# Patient Record
Sex: MALE | URBAN - METROPOLITAN AREA
[De-identification: names, ages, dates, MRNs, and addresses within clinical notes are randomized per-mention and may not be internally consistent; named-entity substitution may affect disease eponyms.]

---

## 2017-02-16 ENCOUNTER — OFFICE VISIT (OUTPATIENT)
Dept: FAMILY MEDICINE | Facility: CLINIC | Age: 52
End: 2017-02-16

## 2017-02-16 VITALS
OXYGEN SATURATION: 98 % | DIASTOLIC BLOOD PRESSURE: 100 MMHG | TEMPERATURE: 98.2 F | SYSTOLIC BLOOD PRESSURE: 132 MMHG | RESPIRATION RATE: 16 BRPM | BODY MASS INDEX: 30.17 KG/M2 | WEIGHT: 187.7 LBS | HEIGHT: 66 IN | HEART RATE: 64 BPM

## 2017-02-16 DIAGNOSIS — K30 INDIGESTION: ICD-10-CM

## 2017-02-16 DIAGNOSIS — M54.12 CERVICAL RADICULOPATHY: Primary | ICD-10-CM

## 2017-02-16 RX ORDER — IBUPROFEN 200 MG
400 TABLET ORAL EVERY 6 HOURS PRN
Qty: 100 TABLET | Refills: 0 | Status: SHIPPED | OUTPATIENT
Start: 2017-02-16 | End: 2017-03-18

## 2017-02-16 NOTE — MR AVS SNAPSHOT
After Visit Summary   2017    El Hightower    MRN: [id number]           Patient Information     Date Of Birth          1965        Visit Information        Provider Department      2017 7:30 PM Clinician, Phi [facility]        Today's Diagnoses     Cervical radiculopathy    -  1      Care Instructions    Patient Visit Summary    Patient Name: El Hightower  MRN: [id number]    Date of Visit: 2017    Principle Diagnosis:Radiculopathy and indigestion    Physician's Recommendations/Instructions: Practice exercises and stretches as directed by physical therapy for neck and chest. Take anti-inflammatory and antacid medication as prescribed.     Lab Tests Performed: None      Physician: Karmen Lopez        Follow-ups after your visit        Who to contact     Please call your clinic at [phone] to:    Ask questions about your health    Make or cancel appointments    Discuss your medicines    Learn about your test results    Speak to your doctor   If you have compliments or concerns about an experience at your clinic, or if you wish to file a complaint, please contact [facility] Patient Relations at [phone] or email us at [email or web address]         Additional Information About Your Visit        MyChart Information     [employer] is an electronic gateway that provides easy, online access to your medical records. With [employer], you can request a clinic appointment, read your test results, renew a prescription or communicate with your care team.     To sign up for [employer] visit the website at [email or web address]   You will be asked to enter the access code listed below, as well as some personal information. Please follow the directions to create your username and password.     Your access code is: [email or web address]  Expires: 2017  9:08 PM     Your access code will  in 90 days. If you need help or a new code,  please contact your [facility] or call [phone] for assistance.        Care EveryWhere ID     This is your Care EveryWhere ID. This could be used by other organizations to access your [place] medical records  [id number]         Blood Pressure from Last 3 Encounters:   No data found for BP    Weight from Last 3 Encounters:   No data found for Wt              Today, you had the following     No orders found for display       Primary Care Provider    None Specified       No primary provider on file.        Thank you!     Thank you for choosing [facility]  for your care. Our goal is always to provide you with excellent care. Hearing back from our patients is one way we can continue to improve our services. Please take a few minutes to complete the written survey that you may receive in the mail after your visit with us. Thank you!             Your Updated Medication List - Protect others around you: Learn how to safely use, store and throw away your medicines at www.disposemymeds.org.      Notice  As of 2/16/2017  9:08 PM    You have not been prescribed any medications.

## 2017-02-17 NOTE — NURSING NOTE
JOSETTE is a 51 y.o male who presented with right sided chest and arm pain that started four months ago. The pain began in his chest and has progressed to include his arm and wrist. Does exercises to help, but it has not been effective. Cracking knuckles helps when pain is in his hands. Describes pain as sharp. Feels it in the joints and sometimes in the muscles. Also complains of gas - pt thought it was related, but antacids not effective. Has experienced this once before when in [facility], was given amoxacillin, which helped. PHQ-2 score = 0    Note by Merle Song, SN

## 2017-02-17 NOTE — PATIENT INSTRUCTIONS
Patient Visit Summary    Patient Name: El Hightower  MRN: [id number]    Date of Visit: 2/16/2017    Principle Diagnosis:Radiculopathy and indigestion    Physician's Recommendations/Instructions: Practice exercises and stretches as directed by physical therapy for neck and chest. Take anti-inflammatory and antacid medication as prescribed.     Lab Tests Performed: None      Physician: Karmen Lopez

## 2017-02-17 NOTE — PROGRESS NOTES
Community Health Worker Progress Note    Encounter Summary    The patient was interested in food resources in the community, so we provided him with a list of food pantries near his home. -[employer]

## 2017-02-17 NOTE — PROGRESS NOTES
"MEDICINE NOTE    SUBJECTIVE:    Campbell Gallegos is a 51-year-old male with no significant past medical history who presents to clinic with right-sided chest pain. The [place] from [facility], and arrived in the country around November, 2016.     Four months prior to his current presentation, the patient developed right-sided chest and shoulder pain. At times, the pain moves downward to his right elbow and right wrist. He states that he feels the discomfort \"from time to time,\" and it is worse in the evening hours or after sleeping on his right side. With deep inspiration, he develops sharp pain in the front of his chest that is localized inferior to his right axilla. He has not taken any medications for pain control, but states that his pain is somewhat relieved by flexing and extending his arm. He denies an inciting injury prior to the onset of pain. He additionally denies weakness, numbness, tingling, diminished sensation or decreased range of motion of his right shoulder, arm, hand and fingers. There is no swelling or redness in the joints of his right upper extremity, and he has not had any neck pain. He denies recent history of palpitations, dyspnea, nausea, diaphoresis, flu-like symptoms, fever or chills. His chest and shoulder pain have not worsened during the past several months, but he sought care today as he would like to keep his symptoms from progressing. He had similar pain in July/August of 2015. He was treated with amoxacillin in his home country of [facility], and the pain resolved shortly thereafter. He was not informed of a diagnosis at that time.     Additionally, the patient reports that he regularly suffers from gas. He denies nausea, vomiting, abdominal pain and diarrhea, but notes occasional constipation. He wonders if the gas discomfort is related to the chest and shoulder pain, but states that the symptoms occur at unrelated times.     REVIEW OF SYSTEMS:  Gen: no fevers, " "chills, night sweats. Pt notes he has been sleeping a lot lately, denies daytime fatigue.  Eyes: no vision change, diplopia, burry vision  Ears, Noses, Mouth, Throat: no ringing in ears or hearing change, no rhinorrhea, no nasal congestion  Cardiac: Right-sided chest pain, see HPI. No palpitations, or pain with walking  Lungs: no dyspnea, cough, or shortness of breath  GI: no nausea, vomiting, diarrhea, no abdominal pain. Notes frequent gas. Occasional constipation.  : no change in urination, hematuria or dysuria  Musculoskeletal: Joint pain in right shoulder, elbow and wrist (see HPI). No LUE or LE joint pain. Full ROM in all extremities.   Skin: no rash, lumps, bruises or recent skin changes  Neuro: no loss of strength or sensation, no numbness or tingling, no headache or dizziness.   Endo: no polyuria or polydipsia  Allergy: no known environmental or drug allergies  Psych: no depression or mood changes    Past Medical History  None    Past Surgical History  None    Family History  None    Social History     Social History     Marital status: Single     Spouse name: N/A     Number of children: N/A     Years of education: N/A     Occupational History     Not on file.     Social History Main Topics     Smoking status: Never Smoker     Smokeless tobacco: Not on file     Alcohol use No     Drug use: No     Sexual activity: Not on file     Other Topics Concern     Not on file     Social History Narrative    2/16/17 - The patient was interested in food resources in the community, so we provided him with a list of food pantries near his home. -[facility]       OBJECTIVE:  Physical Exam:  BP (!) 132/100 (BP Location: Right arm)  Pulse 64  Temp 98.2  F (36.8  C) (Oral)  Resp 16  Ht 5' 5.75\" (167 cm)  Wt 187 lb 11.2 oz (85.1 kg)  SpO2 98%  BMI 30.53 kg/m2  Constitutional: no distress, comfortable, pleasant    Cardiovascular: regular rate and rhythm, normal S1 and S2, no murmurs, rubs or gallops.  Respiratory: clear to " auscultation, no wheezes or crackles, normal breath sounds   Musculoskeletal: Positive Spurling's sign on right. No tenderness with compression of right ulnar nerve at ulnar groove. Tinel's sign negative on right. Mild tenderness to palpation of right trapezius. Chest, shoulder and upper extremities appear symmetric upon inspection.   Skin: no concerning lesions  Psychological: appropriate mood     ASSESSMENT/PLAN:  Cervical radiculopathy  Assessment: Pt with 4 months of right chest/shoulder pain with radiation to right elbow and wrist. No cardiac or infectious symptoms. PE pertinent for positive Spurling's sign on right. Negative tinel's sign on right and normal compression of right ulnar nerve at the ulnar groove. History and PE is consistent with right cervical radiculopathy.   Plan:   - PT consult for appropriate strengthening exercises  - Pt was prescribed Ibuprofen 200 MG tablet; 2 tablets PO q6 hrs prn for pain.     Indigestion  Assessment: Pt with frequent gas and indigestion. Occasional constipation. No diarrhea, abdominal pain, nausea or vomiting. H. Pylori infection could be considered given persistent symptoms and country of origin.   Plan:  - Pt was prescribed ranitidine 150 MG tablet; 1 tablet PO daily as needed for indigestion  - H. Pylori test is not available at [facility]. The patient was instructed to follow up with his PCP for H. Pylori testing if symptoms continue or worsen.       Med Clinician: Kelsi Solares MS2  Preceptor: Karmen Ascencio MD    In supervising the medical student, I repeated the exam documented above.  I have reviewed and verified the student s documentation.  Supervising Provider: Karmen Ascencio MD.

## 2017-02-18 NOTE — PROGRESS NOTES
"[facility] Progress Note    Chief complaint: LINN presented to the clinic with complaints of right chest pain and joint pain.     Subjective:  LINN is a 51-year old male from [facility] who is currently visiting family in the [place]. He has been in the [place] since November of 2016.     Condition 1: Radiculopathy  Patient began experiencing right-sided chest pain that radiates to his elbow and wrist approximately 4 months ago. According to LINN, he experienced a similar pain in July/August of 2016 while in [facility] and was prescribed amoxicillin to treat the pain. The states that the treatment he received was able to alleviate his pain. Patient describes his pain as being \"sharp\" when he takes a deep breath. He believes his pain is especially worse in the evening hours and causes discomfort when he is sleeping. He denies any fatigue, numbness, tingling, or any injuries. Patient states that cracking his knuckles is able to provide pain relief and described the pain as being centered in his joints and shoulders. He has not tried any medication therapy for treatment.      Condition 2: Indigestion  Patient reported episodes of gas and indigestion. He denies nausea, vomiting, diarrhea or abdominal pain. He does not believe the gas and indigestion is related to changes in food. He originally thought the indigestion was related to his chest and shoulder pain. He has taken antacids and tea to relieve the indigestion and gas but has not found them to be helpful.       Current Outpatient Prescriptions   Medication Sig Dispense Refill     ibuprofen (ADVIL/MOTRIN) 200 MG tablet Take 2 tablets (400 mg) by mouth every 6 hours as needed for mild pain (to a maximum of 3200mg daily) 100 tablet 0     ranitidine (ZANTAC) 150 MG tablet Take 1 tablet (150 mg) by mouth daily as needed (indigestion) 30 tablet 0         Objective:  BP (!) 132/100 (BP Location: Right arm)  Pulse 64  Temp 98.2  F (36.8  C) (Oral)  Resp 16  Ht 5' 5.75\" " (167 cm)  Wt 187 lb 11.2 oz (85.1 kg)  SpO2 98%  BMI 30.53 kg/m2    Assessment:     Condition 1- Cervical Radiculopathy - Uncontrolled  DTP: Needs Additional Therapy: untreated condition   Rationale: Upon assessment by MD, it was determined that patient is experiencing cervical radiculopathy. Patient was provided with a PT consult for strength exercises. To alleviate the pain, patient would benefit from a therapy of NSAIDs such as ibuprofen or naproxen.     Condition 2- Indigestion - Uncontrolled  DTP: Needs Additional Therapy: untreated condition   Rationale: Patient is experiencing occasional indigestion and would benefit from a therapy of antacids, H2 receptor antagonists, or PPIs. Patient has already tried antacids and has found it to be ineffective. H2RAs can be taken as needed and would preferable over PPIs in this patient.     Plan:  1. Initiate Ibuprofen 200mg, 2 tablets by mouth every 6 hours as needed for pain (to a maximum of 3200mg per day). Patient can take it with food to avoid GI irritation.  2. Initiate Ranitidine 150mg, 1 tablet by mouth as needed for indigestion.   3. Educate patient to avoid using other NSAIDs while on ibuprofen and to not exceed 3200mg per day. Also educate on side effects such as: GI irritation, nausea, diarrhea, vomiting, and hypotension.   4. Educate on side effects of ranitidine such as headache, nausea, and constipation.       Pharmacy Follow-Up Plan (Method, Date, Parameters):   Follow-up via phone call in 1 week to assess for safety and efficacy of new therapies. Assess for side effects of ibuprofen (GI upset, hypotension, N/V/D) and effectiveness of ibuprofen in relieving shoulder/joint pain. During the call, also follow up on the safety (side effects: headache, nausea, constipation) and efficacy of ranitidine in relieving indigestion. Ask patient to return to clinic if pain has not improved.     PharmCare Clinician: Vanessa Ovalels  Pharmacy Preceptor: Neil  César    _____________________________  Preceptor Use Only:  In supervising the student, I have reviewed and verified the student's documentation and found it to be correct and complete.   Preceptor Signature:Neil Lindsey, Pharm D.  Clinical Oncology Pharmacist  [phone]    February 20, 2017

## 2017-02-23 ENCOUNTER — TELEPHONE (OUTPATIENT)
Dept: FAMILY MEDICINE | Facility: CLINIC | Age: 52
End: 2017-02-23

## 2017-02-24 NOTE — TELEPHONE ENCOUNTER
----- Message from Vanessa Ovalles sent at 2/18/2017  2:18 PM CST -----  Regarding: f/u 2/23/17 English speaking  Contact: [phone]  LINN is a 51-year old male from [facility] who is currently visiting family in the [place]. He has been in [place] since November of 2016. He presented to the clinic with complaints of right chest pain and joint pain. He also had concerns about his indigestion. It was concluded that LINN has cervical radiculopathy which was causing the right shoulder pain.   Plan:  1. Initiate Ibuprofen 200mg, 2 tablets by mouth every 6 hours as needed for pain (to a maximum of 3200mg per day). Patient can take it with food to avoid GI irritation.  2. Initiate Ranitidine 150mg, 1 tablet by mouth as needed for indigestion.     Follow-up via phone call in 1 week (2/23/17) to assess for safety and efficacy of new therapies. Assess for side effects of ibuprofen (GI upset, hypotension, N/V/D) and effectiveness of ibuprofen in relieving shoulder/joint pain. During the call, also follow up on the safety (side effects: headache, nausea, constipation) and efficacy of ranitidine in relieving indigestion.   Ask patient to return to clinic if pain in his shoulders and joints have not improved.

## 2017-02-24 NOTE — TELEPHONE ENCOUNTER
"LINN reports that he is still experiencing shoulder pain. He expressed confusion about the \"as needed\" directions on his ibuprofen prescription and said he has been taking two tablets twice daily; he says they are not helping with his pain. I instructed him to increase his dose to two tablets three times daily with food and to return to the clinic. He says he will be in Monday evening (2/27/17).    He says that his ranitidine is helping his indigestion and he has not experienced nausea or vomiting. He plans to continue using this medication as needed.    LINN says that he has tried to contact the clinic a few times and seemed upset that no one was available to answer his phone. I informed him that the clinic is only open on Monday and Thursday evenings; he wanted my personal phone number but seemed agreeable when I told him he could only call the clinic with problems. I also gave him the name Danielle Resendiz because he pressed for my last name. Perhaps LINN would benefit from additional resources about who to contact if he is concerned about his health.   "

## 2017-02-27 ENCOUNTER — OFFICE VISIT (OUTPATIENT)
Dept: FAMILY MEDICINE | Facility: CLINIC | Age: 52
End: 2017-02-27

## 2017-02-27 VITALS
DIASTOLIC BLOOD PRESSURE: 85 MMHG | WEIGHT: 186.5 LBS | HEART RATE: 57 BPM | RESPIRATION RATE: 12 BRPM | OXYGEN SATURATION: 98 % | HEIGHT: 65 IN | SYSTOLIC BLOOD PRESSURE: 128 MMHG | TEMPERATURE: 98 F | BODY MASS INDEX: 31.07 KG/M2

## 2017-02-27 DIAGNOSIS — M79.621 PAIN OF RIGHT UPPER ARM: Primary | ICD-10-CM

## 2017-02-27 NOTE — Clinical Note
Hi Dr. Lopez,  I have completed my note for patient JZ. Please review, add tie in statement and close encounter.   Thanks!   Eloisa Melendrez, [email or web address]

## 2017-02-27 NOTE — MR AVS SNAPSHOT
After Visit Summary   2017    El Hightower    MRN: [id number]           Patient Information     Date Of Birth          1965        Visit Information        Provider Department      2017 8:15 PM Clinician, Ph[facility]        Care Instructions    Visit Summary 17:     * Referred to [facility] for chest X-Ray and follow-up   * Continue taking the medications as directed  * Come back on either Monday or  at 6:00pm or shortly after for PT Fast Pass           Follow-ups after your visit        Who to contact     Please call your clinic at [phone] to:    Ask questions about your health    Make or cancel appointments    Discuss your medicines    Learn about your test results    Speak to your doctor   If you have compliments or concerns about an experience at your clinic, or if you wish to file a complaint, please contact [facility] Patient Relations at [phone] or email us at [email or web address]         Additional Information About Your Visit        MyChart Information     [employer] is an electronic gateway that provides easy, online access to your medical records. With [employer], you can request a clinic appointment, read your test results, renew a prescription or communicate with your care team.     To sign up for [employer] visit the website at [email or web address]   You will be asked to enter the access code listed below, as well as some personal information. Please follow the directions to create your username and password.     Your access code is: [email or web address]  Expires: 2017  9:08 PM     Your access code will  in 90 days. If you need help or a new code, please contact your [facility] or call [phone] for assistance.        Care EveryWhere ID     This is your Care EveryWhere ID. This could be used by other organizations to  "access your [place] medical records  [id number]        Your Vitals Were     Pulse Temperature Respirations Height Pulse Oximetry BMI (Body Mass Index)    57 98  F (36.7  C) 12 5' 5\" (165.1 cm) 98% 31.04 kg/m2       Blood Pressure from Last 3 Encounters:   02/27/17 128/85   02/16/17 (!) 132/100    Weight from Last 3 Encounters:   02/27/17 186 lb 8 oz (84.6 kg)   02/16/17 187 lb 11.2 oz (85.1 kg)              Today, you had the following     No orders found for display       Primary Care Provider    None Specified       No primary provider on file.        Thank you!     Thank you for choosing [facility]  for your care. Our goal is always to provide you with excellent care. Hearing back from our patients is one way we can continue to improve our services. Please take a few minutes to complete the written survey that you may receive in the mail after your visit with us. Thank you!             Your Updated Medication List - Protect others around you: Learn how to safely use, store and throw away your medicines at www.disposemymeds.org.          This list is accurate as of: 2/27/17  9:32 PM.  Always use your most recent med list.                   Brand Name Dispense Instructions for use    ibuprofen 200 MG tablet    ADVIL/MOTRIN    100 tablet    Take 2 tablets (400 mg) by mouth every 6 hours as needed for mild pain (to a maximum of 3200mg daily)       ranitidine 150 MG tablet    ZANTAC    30 tablet    Take 1 tablet (150 mg) by mouth daily as needed (indigestion)         "

## 2017-02-27 NOTE — Clinical Note
I have completed my note, please review, add tie in statement and close encounter.   Thanks for all your help!   Christy Bailey

## 2017-02-28 NOTE — PATIENT INSTRUCTIONS
Visit Summary 02/27/17:     * Referred to [facility] for chest X-Ray and follow-up   * Continue taking the medications as directed  * Come back on either Monday or Thursdays at 6:00pm or shortly after for PT Fast Pass

## 2017-02-28 NOTE — PROGRESS NOTES
Patient is here for a follow up visit concerning the pain in his right elbow and wrist. This pain is rated at a 6, it comes and goes, and he notices that it comes more often in the evening. He was interested in physical therapy and has a fast past so he is curious to what dates it will be offered. Patient is also concerned about a sharp chest pain upon deep inhalation. Patient states that the pain began in 2012, where he was given amoxicillin which relieved the pain until he started noticing it this past fall. The last month the pain has been worse. Patient also complains of a pain that travels up in the muscle on the back of his arm. He states compression and moving his arm helps relieve the pain. He is currently taking 3 ibuprofen tablets daily and has found that it has helped with managing the pain. Patient only drinks alcohol occasionally. Patient scored a 0 on the PHQ2. Documented by SN. Kasie

## 2017-02-28 NOTE — PROGRESS NOTES
MEDICINE NOTE    SUBJECTIVE:  MIRTHA is a 51 year old male who presents in clinic with right sided arm and chest pain. Pain first started in 2012 in [place], and patient was given amoxicillin which relieved the pain, but was not told what he had. The pain returned a few months ago, and is mostly in his right elbow and wrist with some radiation into the back of his arm. Patient rates the pain as 8/10, but does not describe it as severe. He also has some sharp right-sided chest pain, just below T4, when he breathes in deeply sometimes. Patient denies any shortness of breath or chest pressure/tightness with his pain. Pain occurs randomly and nothing seems to bring it on, but it is usually worse at night. Moving his arm and palpating the painful areas seem to make the pain go away, had takes 2 tablets of ibuprofen 3x/day, which also has alleviated some of the pain. Patient denies any swelling, redness, or warmth around painful areas, and does not have any numbness, tingling, or weakness. Patient does not know what is causing the pain and is returning to clinic because it is not getting better.     REVIEW OF SYSTEMS:  Gen: no fevers, night sweats. Weight gain of 20 lbs in last 3-4 months.  Eyes: no vision change, diplopia or red eyes  Ears, Noses, Mouth, Throat: no ringing in ears or hearing change, no epistaxis or nasal discharge, no oral lesions, throat clear  Cardiac: no chest pain, palpitations, or pain with walking  Lungs: no dyspnea, cough, or shortness of breath  GI: no nausea, vomiting, diarrhea or constipation, no abdominal pain  : no change in urine, hematuria, or sexual dysfunction  Musculoskeletal: See HPI  Skin: no concerning lesions or moles  Neuro: no loss of strength or sensation, no numbness or tingling, no tremor  Endo: no polyuria or polydipsia, no temperature intolerance  Heme/Lymph: no concerning bumps, no bleeding problems  Allergy: no environmental or drug allergies  Psych: no depression or  "anxiety    No past medical history on file.    No past surgical history on file.    No family history on file.    Social History     Social History     Marital status: Single     Spouse name: N/A     Number of children: N/A     Years of education: N/A     Occupational History     Not on file.     Social History Main Topics     Smoking status: Never Smoker     Smokeless tobacco: Not on file     Alcohol use No     Drug use: No     Sexual activity: Not on file     Other Topics Concern     Not on file     Social History Narrative    2/16/17 - The patient was interested in food resources in the community, so we provided him with a list of food pantries near his home. -[facility]        2/27/17 Just moved to the  and is not interested in services at this time. Patient was notified that if anything is needed, they can see the [facility]. -BM       OBJECTIVE:  Physical Exam:  /85  Pulse 57  Temp 98  F (36.7  C)  Resp 12  Ht 5' 5\" (165.1 cm)  Wt 186 lb 8 oz (84.6 kg)  SpO2 98%  BMI 31.04 kg/m2  Constitutional: no distress, comfortable, pleasant   Eyes: anicteric, normal extra-ocular movements   Ears, Nose and Throat: tympanic membranes clear, nose clear and free of lesions, throat clear, neck supple with full range of motion, no thyromegaly.   Cardiovascular: regular rate and rhythm, normal S1 and S2, no murmurs, rubs or gallops, peripheral pulses full and symmetric   Respiratory: clear to auscultation, no wheezes or crackles, normal breath sounds   Gastrointestinal: positive bowel sounds, nontender, no hepatosplenomegaly, no masses   Musculoskeletal: full range of motion, no edema   Skin: no concerning lesions, no jaundice   Neurological: cranial nerves intact, normal strength and sensation, reflexes at patella and biceps normal, normal gait, no tremor   Psychological: appropriate mood   Lymphatic: no cervical  lymphadenopathy    ASSESSMENT/PLAN:  There are no diagnoses linked to this encounter.    This is a 51 year old male " who presents in clinic with right-sided arm and chest pain. Pain could not be elicited on palpation or movement. Patient had full strength, sensation, and range of motion in upper extremities, neck, back, and chest. This is most likely a radiculopathy and patient was given a referral to [facility] for a chest x-ray and was counseled to use his fast pass for Physical Therapy at the [facility], and to continue taking ibuprofen for pain relief.    Med Clinician: Eloisa Melendrez, MS2  Preceptor: Dr. Chidi Lopez    I am signing this for Dr. Lopez who has been unable to sign this note in a timely manner.  The content of this note has been reviewed by the preceptor for accuracy.  I did not participate in the care of this patient.  Ryan Vera MD - Medical Director, [facility]

## 2017-03-01 NOTE — PROGRESS NOTES
[facility] Progress Note    Chief complaint: LINN presents at clinic with arm and chest pain localized to the right side.    Subjective:  Condition 1: Arm Pain  Patient describes pain in his right arm as an 8/10, but later said it was not severe and did not affect his daily life. The pain comes and goes, mainly in the evening. He comments that moving it gives him some relief. The pain started about 2 weeks ago. He had a similar issue in 2012 in [facility] and received amoxicillin when he arrived at the hospital, which helped to eliminate the pain. He describes the pain as minor numbness, and often goes away when he rubs his arm. He was given ibuprofen at the [facility] approximately 2 weeks ago, but was told in a follow-up call to increase the dose to better manage the pain. He has seen improvements since then. He was hoping to see PT tonight, but will come back since he has a PT fast pass.    Condition 2: Chest Pain  LINN has felt chest pain that he describes as a sharp pain when taking a deep breath in. He mentioned that compressing the chest seems to eliminate the pain. He was given ranitidine at his last visit, in which he denies any adverse reactions.    Condition 3: General Health  Patient denies any caffeine, tobacco, alcohol, or illicit drug use. He takes vitamin C occasionally when he starts to have a sore throat. He has noticed significant weight gain (approximately 20 lbs) in a short time span.     Current Outpatient Prescriptions   Medication Sig Dispense Refill     Ascorbic Acid (VITAMIN C PO) Take by mouth daily as needed       ibuprofen (ADVIL/MOTRIN) 200 MG tablet Take 2 tablets (400 mg) by mouth every 6 hours as needed for mild pain (to a maximum of 3200mg daily) (Patient taking differently: Take 400 mg by mouth every 8 hours as needed for mild pain (to a maximum of 3200mg daily) ) 100 tablet 0     ranitidine (ZANTAC) 150 MG tablet Take 1 tablet (150 mg) by mouth daily as needed (indigestion) 30 tablet 0  "        Objective:   /85  Pulse 57  Temp 98  F (36.7  C)  Resp 12  Ht 5' 5\" (165.1 cm)  Wt 186 lb 8 oz (84.6 kg)  SpO2 98%  BMI 31.04 kg/m2    Assessment:     Condition 1- Arm Pain: uncontrolled  DTP: LINN may need additional drug therapy after results of chest X-ray.  Rationale: Patient was referred to another clinic to receive a chest x-ray to better understand the arm/chest pain. He may need additional drug therapy after results are obtained.    Condition 2- Chest Pain: uncontrolled  DTP: LINN may need additional drug therapy after results of chest X-ray.  Rationale: Patient was referred to another clinic to receive a chest x-ray to better understand the arm/chest pain. He may need additional drug therapy after results are obtained.    Condition 3- General Health: controlled  Rationale: LINN is managing his general health well at this time.      Plan:  1. Continue ibuprofen for chest/arm pain.  2. Continue ranitidine for chest pain.  3. Refer patient to another clinic to obtain a chest x-ray.  4. Return to clinic for a PT visit.    Pharmacy Follow-Up Plan (Method, Date, Parameters): No new medications were added at this time, so no follow-up is required at this time.    PharmCare Clinician: Christy Bailey  Pharmacy Preceptor: Geraldo Butt    _____________________________  Preceptor Use Only:  In supervising the student, I have reviewed and verified the student's documentation and found it to be correct and complete.   Preceptor Signature: Geraldo Butt PharmD, BCPS     Patient presented to clinic with c/o pain in his arm and chest.  After examination it was thought to not be cardiac in nature.  Patient was referred for imaging and to see PT for treatment of pain.     HPI      ROS      Physical Exam      "

## 2018-06-20 NOTE — Clinical Note
Abiel Trejo, I have completed my note, please review, add tie in statement and close encounter. If you have any questions, please send me an email at [email or web address]. Thank you so much for your help!   Best wishes,  [facility]